# Patient Record
Sex: MALE
[De-identification: names, ages, dates, MRNs, and addresses within clinical notes are randomized per-mention and may not be internally consistent; named-entity substitution may affect disease eponyms.]

---

## 2022-10-27 ENCOUNTER — NURSE TRIAGE (OUTPATIENT)
Dept: OTHER | Facility: CLINIC | Age: 52
End: 2022-10-27

## 2022-10-27 NOTE — TELEPHONE ENCOUNTER
used     Location of patient: [place]    Subjective: Caller states \"Insurance question\"     Current Symptoms: States he went to a clinic where they didn't accept his insurance. Calling to find a PCP as well as a hospital that will be covered. Onset: n/a    Associated Symptoms: NA    Pain Severity: n/a    Temperature: n/a    What has been tried: n/a    LMP: NA Pregnant: NA    Recommended disposition:  No triage, need insurance coverage info    Care advice provided, patient verbalizes understanding; denies any other questions or concerns; instructed to call back for any new or worsening symptoms. No triage. Requesting insurance coverage info. This triage is a result of a call to [address] [place] [address]. Please do not respond to the triage nurse through this encounter. Any subsequent communication should be directly with the patient. Reason for Disposition   Information only question and nurse able to answer    Protocols used:  Information Only Call - No Triage-[place]ULT-[place]